# Patient Record
(demographics unavailable — no encounter records)

---

## 2024-10-30 NOTE — END OF VISIT
[FreeTextEntry3] : I, Dr. Bennett, personally performed the evaluation and management (E/M) services for this new patient.  That E/M includes conducting the clinically appropriate initial history &/or exam, assessing all conditions, and establishing the plan of care.  Today, my LORENA, Lencho Trejo, was here to observe my evaluation and management service for this patient & follow plan of care established by me going forward.

## 2024-10-30 NOTE — HISTORY OF PRESENT ILLNESS
[FreeTextEntry1] : Mary Kate Valera is a 26 year M with no significant PMHx presenting for varicocele and hypogonadism on 10/30/2024 Referred Dr. Ling Veloz   He reports - 1.5 year of trying. Wife Janell, 23, never pregnant, neg female factor at this time   - patient would like varicocele eval as father uncle etc. had in the past with difficulty conceiving.  - has had no work up otherwise  He denies Urinary complaints ED fever chills n/v/d flank pains h x of nephrolithiasis    Social history; Denies   Family history: reports parents had difficulty conceiving, father with hx of varicocele    Allergies:NKDA

## 2024-10-30 NOTE — PHYSICAL EXAM
[Normal Appearance] : normal appearance [General Appearance - In No Acute Distress] : no acute distress [Edema] : no peripheral edema [] : no respiratory distress [Exaggerated Use Of Accessory Muscles For Inspiration] : no accessory muscle use [Abdomen Soft] : soft [Abdomen Tenderness] : non-tender [Normal Station and Gait] : the gait and station were normal for the patient's age [Oriented To Time, Place, And Person] : oriented to person, place, and time [Affect] : the affect was normal [Chaperone Present] : A chaperone was present in the examining room during all aspects of the physical examination [Urethral Meatus] : meatus normal [Penis Abnormality] : normal circumcised penis [Urinary Bladder Findings] : the bladder was normal on palpation [Scrotum] : the scrotum was normal [Epididymis] : the epididymides were normal [Testes Tenderness] : no tenderness of the testes [Testes Mass (___cm)] : there were no testicular masses [FreeTextEntry1] : overweight  [de-identified] : 20 cc testes b/l, palpable vasa, no varicoceles palpated with valsalva  [FreeTextEntry2] : Geoff Herrera RN

## 2025-03-25 NOTE — HISTORY OF PRESENT ILLNESS
[FreeTextEntry1] : 26M here for follow up for hypogonadism   - patient reports weight gain, loss of appetite, and poor sleep since starting clomid  - , E2 69, FSH 4.9 LH 3.9 - CBC and LFTs good - endorses some nipple sensitivity and breast "puffiness" - SA performed yesterday, pending results  - otherwise feels well  Denies fever chills n/v/d urinary complaints ED hot flashes

## 2025-03-25 NOTE — ASSESSMENT
[FreeTextEntry1] : 26M here for follow up for hypogonadism   Hypogonadism  - clomid 50 mg QOD  -start anastrozole 1 mg twice weekly - TT LH FSH E2 CBC CMP 4 weeks  -review labs and SA by phone  - f/u 6 months.

## 2025-03-25 NOTE — PHYSICAL EXAM
[Normal Appearance] : normal appearance [General Appearance - In No Acute Distress] : no acute distress [Edema] : no peripheral edema [] : no respiratory distress [Exaggerated Use Of Accessory Muscles For Inspiration] : no accessory muscle use [Abdomen Soft] : soft [Abdomen Tenderness] : non-tender [Normal Station and Gait] : the gait and station were normal for the patient's age [Oriented To Time, Place, And Person] : oriented to person, place, and time [Affect] : the affect was normal [de-identified] : breast tissue non-tender

## 2025-03-25 NOTE — END OF VISIT
[FreeTextEntry3] : I, Dr. Bennett, personally performed the evaluation and management (E/M) services for this established patient who presents today with (a) new problem(s)/exacerbation of (an) existing condition(s). That E/M includes conducting the clinically appropriate interval history &/or exam, assessing all new/exacerbated conditions, and establishing a new plan of care. Today, my LORENA, Lencho Trejo, was here to observe my evaluation and management service for this new problem/exacerbated condition and follow the plan of care established by me going forward